# Patient Record
Sex: FEMALE | Race: WHITE | URBAN - METROPOLITAN AREA
[De-identification: names, ages, dates, MRNs, and addresses within clinical notes are randomized per-mention and may not be internally consistent; named-entity substitution may affect disease eponyms.]

---

## 2023-01-04 ENCOUNTER — TELEPHONE (OUTPATIENT)
Dept: SURGERY | Age: 43
End: 2023-01-04

## 2023-01-04 ENCOUNTER — INITIAL CONSULT (OUTPATIENT)
Dept: SURGERY | Age: 43
End: 2023-01-04
Payer: COMMERCIAL

## 2023-01-04 VITALS
WEIGHT: 105 LBS | TEMPERATURE: 98.2 F | BODY MASS INDEX: 19.32 KG/M2 | HEIGHT: 62 IN | DIASTOLIC BLOOD PRESSURE: 74 MMHG | SYSTOLIC BLOOD PRESSURE: 117 MMHG | HEART RATE: 97 BPM

## 2023-01-04 DIAGNOSIS — L98.9 SCALP LESION: Primary | ICD-10-CM

## 2023-01-04 PROBLEM — M79.89 SOFT TISSUE MASS: Status: ACTIVE | Noted: 2023-01-04

## 2023-01-04 PROCEDURE — G8427 DOCREV CUR MEDS BY ELIG CLIN: HCPCS | Performed by: SURGERY

## 2023-01-04 PROCEDURE — 99203 OFFICE O/P NEW LOW 30 MIN: CPT | Performed by: SURGERY

## 2023-01-04 PROCEDURE — G8420 CALC BMI NORM PARAMETERS: HCPCS | Performed by: SURGERY

## 2023-01-04 PROCEDURE — G8484 FLU IMMUNIZE NO ADMIN: HCPCS | Performed by: SURGERY

## 2023-01-04 PROCEDURE — 1036F TOBACCO NON-USER: CPT | Performed by: SURGERY

## 2023-01-04 NOTE — TELEPHONE ENCOUNTER
Per Dr. Mary Quintana, patient is scheduled for Excision soft tissue neoplasm scalp  at [address] on 23. Surgery scheduled via [facility], surgeon's calendar updated. Dr. Mary Quintana to enter orders. Follow up appointment scheduled. Electronically signed by Darell Mchugh RN on 2023 at 9:14 AM    Prior Authorization Form:      DEMOGRAPHICS:                     Patient Name:  Jeffrey Khan  Patient :  1980            Insurance:  Payor: [employer] / Plan: [employer] ACCESS / Product Type: *No Product type* /   Insurance ID Number:    Payer/Plan Subscr  Sex Relation Sub. Ins. ID Effective Group Num   1.  [address] [facility] DA* 1980 Female Self [id number]                                    [address] [id number]         DIAGNOSIS & PROCEDURE:                       Procedure/Operation: Excision soft tissue neoplasm scalp            CPT Code: 43595    Diagnosis:  scalp lesion    ICD10 Code: L98.9    Location:  [facility]    Surgeon:  Kanika Bell INFORMATION:                          Date: 23    Time: TBD              Anesthesia:  MAC/TIVA                                                       Status:  Outpatient        Special Comments:         Electronically signed by Darell Mchugh RN on 2023 at 9:14 AM

## 2023-01-04 NOTE — PROGRESS NOTES
General Surgery History and Physical  [facility]    Patient's Name/Date of Birth: Joanna Butt / 1980    Date: January 4, 2023     Surgeon: Kennedy Severin, M.D.    PCP: Rachel Mitchell MD     Chief Complaint: soft tissue neoplasm of the scalp    HPI:   Joanna Butt is a 36 y.o. female who presents for evaluation of soft tissue neoplasm of the scalp. Timing is constant, radiation to right side, alleviated by none and started years ago and severity is 7/10. Admits previous drainage, some pain. Denies similar in the past. No fever, chills. Denies previous at the same site. Would like to have removed. There is no problem list on file for this patient. History reviewed. No pertinent past medical history. Past Surgical History:   Procedure Laterality Date    LEEP  1999    WISDOM TOOTH EXTRACTION  2000       Allergies   Allergen Reactions    Vancomycin Anaphylaxis    Amoxicillin Hives    Demerol [Meperidine] Other (See Comments)     Increased heart rate       The patient has a family history that is negative for severe cardiovascular or respiratory issues, negative for reaction to anesthesia. Patient did not report any history of skin cancer in their mother or father. Time spent reviewing past medical, surgical, social and family history, vitals, nursing assessment and images. No changes from above documented history.     Social History     Socioeconomic History    Marital status:      Spouse name: Not on file    Number of children: Not on file    Years of education: Not on file    Highest education level: Not on file   Occupational History    Not on file   Tobacco Use    Smoking status: Never    Smokeless tobacco: Never   Substance and Sexual Activity    Alcohol use: No    Drug use: Not on file    Sexual activity: Not on file   Other Topics Concern    Not on file   Social History Narrative    Not on file     Social Determinants of Health     Financial Resource Strain: Not on file   Food Insecurity: Not on file   Transportation Needs: Not on file   Physical Activity: Not on file   Stress: Not on file   Social Connections: Not on file   Intimate Partner Violence: Not on file   Housing Stability: Not on file           Review of Systems  A complete 10 system review was performed and are otherwise negative unless mentioned in the above HPI. Specific negatives are listed below but may not include all those reviewed.     General ROS: negative obtundation, AMS  ENT ROS: negative rhinorrhea, epistaxis  Allergy and Immunology ROS: negative itchy/watery eyes or nasal congestion  Hematological and Lymphatic ROS: negative spontaneous bleeding or bruising  Endocrine ROS: negative  lethargy, mood swings, palpitations or polydipsia/polyuria  Respiratory ROS: negative sputum changes, stridor, tachypnea or wheezing  Cardiovascular ROS: negative for - loss of consciousness, murmur or orthopnea  Gastrointestinal ROS: negative for - hematochezia or hematemesis  Genito-Urinary ROS: negative for -  genital discharge or hematuria  Musculoskeletal ROS: negative for - focal weakness, gangrene  Psych/Neuro ROS: negative for - visual or auditory hallucinations, suicidal ideation    Physical exam:   /74   Pulse 97   Temp 98.2 °F (36.8 °C)   Ht 5' 2\" (1.575 m)   Wt 105 lb (47.6 kg)   BMI 19.20 kg/m²   General appearance:  NAD, appears stated age  Head: NCAT, PERRLA, EOMI, red conjunctiva  Neck: supple, no masses, trachea midline  Lungs: Equal chest rise bilateral, no retractions, no wheezing  Heart: Reg rate  Abdomen: soft, nontender, nondistended  Skin; soft tissue mass 2 cm located right scalp, mobile, mild tender, no drainage  Gu: no cva tenderness  Extremities: atraumatic, no focal motor deficits, no open wounds  Psych: No tremor, visual hallucinations      Radiology: I reviewed relevant abdominal imaging from this admission and that available in the EMR Assessment:  Jeffrey Khan is a 36 y.o. 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 Plan:  We discussed the underlying nature of soft tissue masses including the proposal diagnosis of this specific mass as it relates to benign or malignant disease  We discussed the nonoperative management and monitoring as an option  We discussed antibiotic treatment mechanism of action of the proposed antibiotics  We discussed the nature of the surgery and described the nature of closure including sutures and glue  I independently reviewed all relative urgent care and medical specialist notes in the electronic medical record pertaining to the above procedure and any contributing factors that may affect surgical intervention relating to bleeding or cardiovascular tolerance of the procedure. We discussed the possibility of wound dehiscence the aftercare that this may necessitate we discussed the ongoing need for surveillance of wound care as well as care after surgery of the incisions and/or wounds    OR for excision soft tissue neoplasm of scalp  Discussed the risk, benefits and alternatives of surgery including wound infections, bleeding, scar, seroma, hematoma and recurrence of the mass or similar in other locations and the risks of general anesthetic including MI, CVA, sudden death or reactions to anesthetic medications. The patient understands the risks and alternatives and the possibility of converting to an open procedure. All questions were answered to the patient's satisfaction and they freely signed the consent.        Bridget Dickerson MD  9:06 AM  1/4/2023

## 2023-01-09 NOTE — TELEPHONE ENCOUNTER
Per patient request, excision soft tissue neoplasm scalp moved to 1/25/23 at [facility]. Follow up appointment r/s. Surgery scheduling notified, surgeons calendar updated.   Electronically signed by Kurt Joy RN on 1/9/2023 at 9:10 AM

## 2025-06-18 ENCOUNTER — OFFICE VISIT (OUTPATIENT)
Dept: SURGERY | Age: 45
End: 2025-06-18
Payer: COMMERCIAL

## 2025-06-18 VITALS
SYSTOLIC BLOOD PRESSURE: 136 MMHG | BODY MASS INDEX: 20.38 KG/M2 | OXYGEN SATURATION: 99 % | WEIGHT: 115 LBS | TEMPERATURE: 97.3 F | HEART RATE: 96 BPM | DIASTOLIC BLOOD PRESSURE: 79 MMHG | HEIGHT: 63 IN

## 2025-06-18 DIAGNOSIS — R22.9 MASS OF SKIN: Primary | ICD-10-CM

## 2025-06-18 PROCEDURE — G8420 CALC BMI NORM PARAMETERS: HCPCS | Performed by: PHYSICIAN ASSISTANT

## 2025-06-18 PROCEDURE — 99203 OFFICE O/P NEW LOW 30 MIN: CPT | Performed by: PHYSICIAN ASSISTANT

## 2025-06-18 PROCEDURE — 1036F TOBACCO NON-USER: CPT | Performed by: PHYSICIAN ASSISTANT

## 2025-06-18 PROCEDURE — G8427 DOCREV CUR MEDS BY ELIG CLIN: HCPCS | Performed by: PHYSICIAN ASSISTANT

## 2025-06-18 RX ORDER — MULTIVITAMIN WITH IRON
1 TABLET ORAL DAILY
COMMUNITY

## 2025-06-18 NOTE — PROGRESS NOTES
Department of Plastic Surgery - Adult  Attending Consult Note      CHIEF COMPLAINT:   Mass of right scalp    History Obtained From:  patient    HISTORY OF PRESENT ILLNESS:                The patient is a 45 y.o. female who presents with right scalp subcutaneous mass.  The patient states that they first noticed the mass several months ago.  It has  grown in size since they first noticed the mass.  The mass has  had a history of discharge.  The pt has not had the mass biopsied previously.  The patient states the mass is  painfull.  The pt denies any associated symptoms.      Past Medical History:    History reviewed. No pertinent past medical history.  Past Surgical History:    Past Surgical History:   Procedure Laterality Date    LEE  1999    WISDOM TOOTH EXTRACTION  2000     Current Medications:      Current Outpatient Medications   Medication Sig Dispense Refill    Multiple Vitamin (MULTIVITAMIN) TABS tablet Take 1 tablet by mouth daily      Prenatal Vit-Fe Fumarate-FA (PRENATAL 1 PLUS 1) 65-1 MG TABS Take 1 tablet by mouth daily.   (Patient not taking: Reported on 6/18/2025)       No current facility-administered medications for this visit.     Allergies:  Vancomycin and Demerol [meperidine]    Social History:   Social History     Socioeconomic History    Marital status:      Spouse name: Not on file    Number of children: Not on file    Years of education: Not on file    Highest education level: Not on file   Occupational History    Not on file   Tobacco Use    Smoking status: Never    Smokeless tobacco: Never   Vaping Use    Vaping status: Never Used   Substance and Sexual Activity    Alcohol use: Yes    Drug use: Never    Sexual activity: Not on file   Other Topics Concern    Not on file   Social History Narrative    Not on file     Social Drivers of Health     Financial Resource Strain: Low Risk  (5/19/2025)    Received from [facility]    Overall Financial Resource Strain (CARDIA)     Difficulty

## 2025-08-13 ENCOUNTER — PROCEDURE VISIT (OUTPATIENT)
Dept: SURGERY | Age: 45
End: 2025-08-13

## 2025-08-13 DIAGNOSIS — R22.9 SUBCUTANEOUS MASS: Primary | ICD-10-CM

## 2025-08-13 RX ORDER — HYDROCODONE BITARTRATE AND ACETAMINOPHEN 5; 325 MG/1; MG/1
1 TABLET ORAL EVERY 6 HOURS PRN
Qty: 5 TABLET | Refills: 0 | Status: SHIPPED | OUTPATIENT
Start: 2025-08-13 | End: 2025-08-16

## 2025-08-13 RX ORDER — CEPHALEXIN 500 MG/1
500 CAPSULE ORAL 4 TIMES DAILY
Qty: 20 CAPSULE | Refills: 0 | Status: SHIPPED | OUTPATIENT
Start: 2025-08-13 | End: 2025-08-18

## 2025-08-18 LAB — SURGICAL PATHOLOGY REPORT: NORMAL

## 2025-08-22 ENCOUNTER — OFFICE VISIT (OUTPATIENT)
Dept: SURGERY | Age: 45
End: 2025-08-22

## 2025-08-22 VITALS
TEMPERATURE: 97.7 F | HEART RATE: 79 BPM | DIASTOLIC BLOOD PRESSURE: 70 MMHG | SYSTOLIC BLOOD PRESSURE: 104 MMHG | OXYGEN SATURATION: 97 % | RESPIRATION RATE: 20 BRPM

## 2025-08-22 DIAGNOSIS — L72.12 TRICHILEMMAL CYST: Primary | ICD-10-CM

## 2025-08-22 PROCEDURE — 99024 POSTOP FOLLOW-UP VISIT: CPT | Performed by: PLASTIC SURGERY
